# Patient Record
Sex: MALE | URBAN - METROPOLITAN AREA
[De-identification: names, ages, dates, MRNs, and addresses within clinical notes are randomized per-mention and may not be internally consistent; named-entity substitution may affect disease eponyms.]

---

## 2021-06-09 ENCOUNTER — APPOINTMENT (RX ONLY)
Dept: URBAN - METROPOLITAN AREA CLINIC 41 | Facility: CLINIC | Age: 66
Setting detail: DERMATOLOGY
End: 2021-06-09

## 2021-06-09 DIAGNOSIS — L21.8 OTHER SEBORRHEIC DERMATITIS: ICD-10-CM | Status: WELL CONTROLLED

## 2021-06-09 PROCEDURE — ? PRESCRIPTION

## 2021-06-09 PROCEDURE — 99204 OFFICE O/P NEW MOD 45 MIN: CPT

## 2021-06-09 PROCEDURE — ? COUNSELING

## 2021-06-09 PROCEDURE — ? TREATMENT REGIMEN

## 2021-06-09 RX ORDER — KETOCONAZOLE 20 MG/ML
SHAMPOO, SUSPENSION TOPICAL BIW
Qty: 1 | Refills: 11 | Status: ERX

## 2021-06-09 ASSESSMENT — LOCATION SIMPLE DESCRIPTION DERM: LOCATION SIMPLE: LEFT SCALP

## 2021-06-09 ASSESSMENT — LOCATION DETAILED DESCRIPTION DERM: LOCATION DETAILED: LEFT MEDIAL FRONTAL SCALP

## 2021-06-09 ASSESSMENT — LOCATION ZONE DERM: LOCATION ZONE: SCALP

## 2021-06-09 NOTE — PROCEDURE: COUNSELING
Shampoo Recommendations: Medicated shampoos such as:\\nNeutragena T Sal or T Gel\\nSelsun Blue\\nHead and shoulders
Detail Level: Zone